# Patient Record
Sex: MALE | Race: WHITE | NOT HISPANIC OR LATINO | URBAN - METROPOLITAN AREA
[De-identification: names, ages, dates, MRNs, and addresses within clinical notes are randomized per-mention and may not be internally consistent; named-entity substitution may affect disease eponyms.]

---

## 2017-04-26 ENCOUNTER — OFFICE (OUTPATIENT)
Dept: URBAN - METROPOLITAN AREA CLINIC 78 | Facility: CLINIC | Age: 27
End: 2017-04-26

## 2017-04-26 VITALS
WEIGHT: 206 LBS | DIASTOLIC BLOOD PRESSURE: 69 MMHG | TEMPERATURE: 98.5 F | HEART RATE: 70 BPM | HEIGHT: 69 IN | SYSTOLIC BLOOD PRESSURE: 120 MMHG

## 2017-04-26 DIAGNOSIS — R12 HEARTBURN: ICD-10-CM

## 2017-04-26 PROCEDURE — 99202 OFFICE O/P NEW SF 15 MIN: CPT

## 2017-04-26 RX ORDER — OMEPRAZOLE 40 MG/1
CAPSULE, DELAYED RELEASE ORAL
Qty: 90 | Refills: 3 | Status: ACTIVE

## 2017-06-13 ENCOUNTER — OFFICE (OUTPATIENT)
Dept: URBAN - METROPOLITAN AREA CLINIC 32 | Facility: CLINIC | Age: 27
End: 2017-06-13

## 2017-06-13 VITALS
OXYGEN SATURATION: 100 % | HEART RATE: 78 BPM | WEIGHT: 206 LBS | RESPIRATION RATE: 14 BRPM | RESPIRATION RATE: 12 BRPM | SYSTOLIC BLOOD PRESSURE: 125 MMHG | OXYGEN SATURATION: 96 % | HEIGHT: 69 IN | SYSTOLIC BLOOD PRESSURE: 138 MMHG | HEART RATE: 90 BPM | DIASTOLIC BLOOD PRESSURE: 81 MMHG | DIASTOLIC BLOOD PRESSURE: 82 MMHG | TEMPERATURE: 97.7 F

## 2017-06-13 DIAGNOSIS — K21.0 GASTRO-ESOPHAGEAL REFLUX DISEASE WITH ESOPHAGITIS: ICD-10-CM

## 2017-06-13 DIAGNOSIS — K29.60 OTHER GASTRITIS WITHOUT BLEEDING: ICD-10-CM

## 2017-06-13 DIAGNOSIS — R12 HEARTBURN: ICD-10-CM

## 2017-06-13 PROBLEM — R93.3 ABNORMAL FINDINGS ON DX IMAGING OF PRT DIGESTIVE TRACT: Status: ACTIVE | Noted: 2017-06-13

## 2017-06-13 PROBLEM — K20.9 ESOPHAGITIS, UNSPECIFIED: Status: ACTIVE | Noted: 2017-06-13

## 2017-06-13 PROCEDURE — 43239 EGD BIOPSY SINGLE/MULTIPLE: CPT

## 2017-06-13 PROCEDURE — 00740: CPT | Mod: AA,QS

## 2017-06-15 LAB — PATHOLOGY REPORT: (no result)

## 2018-08-31 ENCOUNTER — OFFICE (OUTPATIENT)
Dept: URBAN - METROPOLITAN AREA CLINIC 33 | Facility: CLINIC | Age: 28
End: 2018-08-31

## 2018-08-31 VITALS
HEIGHT: 69 IN | DIASTOLIC BLOOD PRESSURE: 87 MMHG | TEMPERATURE: 97.7 F | SYSTOLIC BLOOD PRESSURE: 129 MMHG | HEART RATE: 79 BPM | WEIGHT: 213 LBS

## 2018-08-31 DIAGNOSIS — R12 HEARTBURN: ICD-10-CM

## 2018-08-31 DIAGNOSIS — K20.8 OTHER ESOPHAGITIS: ICD-10-CM

## 2018-08-31 DIAGNOSIS — K29.70 GASTRITIS, UNSPECIFIED, WITHOUT BLEEDING: ICD-10-CM

## 2018-08-31 PROCEDURE — 99213 OFFICE O/P EST LOW 20 MIN: CPT

## 2018-08-31 RX ORDER — OMEPRAZOLE 40 MG/1
CAPSULE, DELAYED RELEASE ORAL
Qty: 90 | Refills: 3 | Status: ACTIVE

## 2018-08-31 NOTE — SERVICEHPINOTES
EMELIA ARZATE   is a   28   male who presents for yearly med refill of omeprazole 40mg daily. He denies any issues as long as he takes omeprazole daily-- no breakthrough symptoms on PPI. He has tried to skip a dose and has returning symptoms of heartburn, nausea, and throat burning. He drinks 2-3 cups of coffee daily, eats a lot of tomato based foods (pizza, etc) and spicy foods. He also occasionally eats late at night. He denies any dysphagia, n/v, or abdominal pain. No regular NSAID use. No weight loss, fever, chills. EGD done 6/2017 revealed mild gastritis and mild esophagitis bx neg for urena's, h pylori. BR

## 2019-09-20 ENCOUNTER — OFFICE (OUTPATIENT)
Dept: URBAN - METROPOLITAN AREA CLINIC 78 | Facility: CLINIC | Age: 29
End: 2019-09-20

## 2019-09-20 VITALS
SYSTOLIC BLOOD PRESSURE: 139 MMHG | TEMPERATURE: 97.6 F | HEIGHT: 69 IN | HEART RATE: 90 BPM | DIASTOLIC BLOOD PRESSURE: 86 MMHG | WEIGHT: 208 LBS

## 2019-09-20 DIAGNOSIS — R12 HEARTBURN: ICD-10-CM

## 2019-09-20 DIAGNOSIS — K29.70 GASTRITIS, UNSPECIFIED, WITHOUT BLEEDING: ICD-10-CM

## 2019-09-20 PROCEDURE — 99214 OFFICE O/P EST MOD 30 MIN: CPT

## 2019-09-20 RX ORDER — OMEPRAZOLE 40 MG/1
CAPSULE, DELAYED RELEASE ORAL
Qty: 90 | Refills: 3 | Status: ACTIVE

## 2019-09-20 NOTE — SERVICEHPINOTES
EMELIA ARZATE   is a   29   male who is here for annual GERD visit and requesting yearly med refill of omeprazole 40mg daily. He denies any issues as long as he takes omeprazole daily-- no breakthrough symptoms on PPI. He has tried to skip a dose and has returning symptoms of heartburn, nausea, and throat burning. He drinks 2-3 cups of coffee daily: Avoid tomato based foods (pizza, etc) and spicy meals. He does occasionally eat late at night. No regular NSAID use. Denies n/v, dysphagia, abdominal pain, melena, change in bowel habits, weight loss. Prior EGD in 06/2017 revealed mild gastritis and mild esophagitis bx neg for urena's, h pylori.BR

## 2020-10-06 ENCOUNTER — OFFICE (OUTPATIENT)
Dept: URBAN - METROPOLITAN AREA TELEHEALTH 7 | Facility: TELEHEALTH | Age: 30
End: 2020-10-06

## 2020-10-06 VITALS — WEIGHT: 210 LBS | HEIGHT: 69 IN

## 2020-10-06 DIAGNOSIS — K29.70 GASTRITIS, UNSPECIFIED, WITHOUT BLEEDING: ICD-10-CM

## 2020-10-06 DIAGNOSIS — R12 HEARTBURN: ICD-10-CM

## 2020-10-06 PROCEDURE — 99214 OFFICE O/P EST MOD 30 MIN: CPT | Mod: GQ | Performed by: PHYSICIAN ASSISTANT

## 2020-10-06 RX ORDER — OMEPRAZOLE 40 MG/1
CAPSULE, DELAYED RELEASE ORAL
Qty: 90 | Refills: 3 | Status: ACTIVE

## 2020-10-06 NOTE — SERVICENOTES
Patient's visit was conducted through video telecommunication. Patient consented before the start of visit as to understanding of privacy concerns, possible technological failure, and their responsibility of carrying out instructions of plan.I have reviewed the history, physical exam, assessment and management plans.  I concur with or have edited all elements of her note.

## 2020-10-06 NOTE — SERVICEHPINOTES
PATIENT VERIFIED BY DATE OF BIRTH AND NAME. Patient has been consented for this telecommunication visit. Reviewed PmHx, FmHx, SHx. Mr. Urias is 31 yo male who is here for annual GERD visit and rx refill of PPI. He reports GERD is well controlled on daily Omeprazole 40 mg qd given no breakthrough symptoms and enjoys eating all types of foods including pizza, spicy meals, coffee. He has tried to skip a dose for 1-2 days and notes recurrent heartburn/dyspepsia. He drinks 2-3 cups of coffee daily. He does occasionally eat late at night. No regular NSAID use. Prior EGD in 06/2017 revealed mild gastritis and mild esophagitis bx neg for urena's, h pylori. ROS per HPI. Denies chest pain, n/v, dysphagia, abdominal pain, melena, change in bowel habits, weight loss. No other complaints. BR

## 2022-11-14 ENCOUNTER — OFFICE (OUTPATIENT)
Dept: URBAN - METROPOLITAN AREA CLINIC 79 | Facility: CLINIC | Age: 32
End: 2022-11-14

## 2022-11-14 VITALS
HEIGHT: 69 IN | SYSTOLIC BLOOD PRESSURE: 141 MMHG | DIASTOLIC BLOOD PRESSURE: 92 MMHG | TEMPERATURE: 97.5 F | WEIGHT: 220 LBS | HEART RATE: 117 BPM

## 2022-11-14 DIAGNOSIS — K29.70 GASTRITIS, UNSPECIFIED, WITHOUT BLEEDING: ICD-10-CM

## 2022-11-14 DIAGNOSIS — R12 HEARTBURN: ICD-10-CM

## 2022-11-14 PROCEDURE — 99214 OFFICE O/P EST MOD 30 MIN: CPT | Performed by: PHYSICIAN ASSISTANT

## 2022-11-14 RX ORDER — OMEPRAZOLE 40 MG/1
CAPSULE, DELAYED RELEASE ORAL
Qty: 90 | Refills: 3 | Status: ACTIVE

## 2022-11-14 NOTE — SERVICEHPINOTES
EMELIA ARZATE   is a   31 yo white male who is here for annual GERD visit and rx refill of PPI. He reports GERD is well controlled on daily Omeprazole 40 mg qd given no breakthrough symptoms and enjoys eating all types of foods including pizza, spicy meals, coffee. He has tried to skip a dose for 1-2 days and notes recurrent heartburn/dyspepsia. He drinks 2-3 cups of coffee daily. He does occasionally eat late at night. No regular NSAID use. Prior EGD in 06/2017 revealed mild gastritis and mild erosive esophagitis bx neg for urena's and neg. h pylori. Denies chest pain, n/v, dysphagia, abdominal pain, melena, change in bowel habits, weight loss. No other complaints.